# Patient Record
Sex: FEMALE | URBAN - METROPOLITAN AREA
[De-identification: names, ages, dates, MRNs, and addresses within clinical notes are randomized per-mention and may not be internally consistent; named-entity substitution may affect disease eponyms.]

---

## 2020-03-17 DIAGNOSIS — Z76.82 ORGAN TRANSPLANT CANDIDATE: Primary | ICD-10-CM

## 2020-03-19 ENCOUNTER — TELEPHONE (OUTPATIENT)
Dept: TRANSPLANT | Facility: CLINIC | Age: 40
End: 2020-03-19

## 2020-10-09 DIAGNOSIS — Z76.82 ORGAN TRANSPLANT CANDIDATE: Primary | ICD-10-CM

## 2020-10-21 ENCOUNTER — TELEPHONE (OUTPATIENT)
Dept: TRANSPLANT | Facility: CLINIC | Age: 40
End: 2020-10-21

## 2020-11-16 ENCOUNTER — TELEPHONE (OUTPATIENT)
Dept: TRANSPLANT | Facility: CLINIC | Age: 40
End: 2020-11-16

## 2020-11-17 ENCOUNTER — HOSPITAL ENCOUNTER (OUTPATIENT)
Dept: RADIOLOGY | Facility: HOSPITAL | Age: 40
Discharge: HOME | End: 2020-11-17
Attending: NURSE PRACTITIONER
Payer: MEDICARE

## 2020-11-17 ENCOUNTER — TELEPHONE (OUTPATIENT)
Dept: TRANSPLANT | Facility: CLINIC | Age: 40
End: 2020-11-17

## 2020-11-17 ENCOUNTER — OFFICE VISIT (OUTPATIENT)
Dept: TRANSPLANT | Facility: CLINIC | Age: 40
End: 2020-11-17
Payer: MEDICARE

## 2020-11-17 VITALS
SYSTOLIC BLOOD PRESSURE: 166 MMHG | TEMPERATURE: 98 F | DIASTOLIC BLOOD PRESSURE: 78 MMHG | HEIGHT: 63 IN | HEART RATE: 82 BPM | OXYGEN SATURATION: 98 % | WEIGHT: 153 LBS | BODY MASS INDEX: 27.11 KG/M2 | RESPIRATION RATE: 16 BRPM

## 2020-11-17 DIAGNOSIS — Z76.82 ORGAN TRANSPLANT CANDIDATE: ICD-10-CM

## 2020-11-17 DIAGNOSIS — E08.10 DIABETES MELLITUS DUE TO UNDERLYING CONDITION WITH KETOACIDOSIS WITHOUT COMA, WITHOUT LONG-TERM CURRENT USE OF INSULIN: ICD-10-CM

## 2020-11-17 DIAGNOSIS — I10 ESSENTIAL HYPERTENSION: ICD-10-CM

## 2020-11-17 DIAGNOSIS — E11.21 DIABETIC NEPHROPATHY ASSOCIATED WITH TYPE 2 DIABETES MELLITUS: ICD-10-CM

## 2020-11-17 DIAGNOSIS — N18.6 ESRD (END STAGE RENAL DISEASE): ICD-10-CM

## 2020-11-17 DIAGNOSIS — D63.1 ANEMIA, CHRONIC RENAL FAILURE, STAGE 5: ICD-10-CM

## 2020-11-17 DIAGNOSIS — N18.5 ANEMIA, CHRONIC RENAL FAILURE, STAGE 5: ICD-10-CM

## 2020-11-17 DIAGNOSIS — Z72.0 TOBACCO ABUSE: ICD-10-CM

## 2020-11-17 DIAGNOSIS — N25.81 SECONDARY HYPERPARATHYROIDISM OF RENAL ORIGIN: ICD-10-CM

## 2020-11-17 PROCEDURE — 99205 OFFICE O/P NEW HI 60 MIN: CPT | Mod: S$PBB,TXP,, | Performed by: INTERNAL MEDICINE

## 2020-11-17 PROCEDURE — 99999 PR PBB SHADOW E&M-EST. PATIENT-LVL IV: CPT | Mod: PBBFAC,TXP,, | Performed by: INTERNAL MEDICINE

## 2020-11-17 PROCEDURE — 71046 X-RAY EXAM CHEST 2 VIEWS: CPT | Mod: 26,TXP,, | Performed by: RADIOLOGY

## 2020-11-17 PROCEDURE — 99205 PR OFFICE/OUTPT VISIT, NEW, LEVL V, 60-74 MIN: ICD-10-PCS | Mod: S$PBB,TXP,, | Performed by: INTERNAL MEDICINE

## 2020-11-17 PROCEDURE — 99204 PR OFFICE/OUTPT VISIT, NEW, LEVL IV, 45-59 MIN: ICD-10-PCS | Mod: S$PBB,TXP,, | Performed by: TRANSPLANT SURGERY

## 2020-11-17 PROCEDURE — 71046 XR CHEST PA AND LATERAL: ICD-10-PCS | Mod: 26,TXP,, | Performed by: RADIOLOGY

## 2020-11-17 PROCEDURE — 72170 X-RAY EXAM OF PELVIS: CPT | Mod: 26,TXP,, | Performed by: RADIOLOGY

## 2020-11-17 PROCEDURE — 71046 X-RAY EXAM CHEST 2 VIEWS: CPT | Mod: TC,TXP

## 2020-11-17 PROCEDURE — 72170 XR PELVIS ROUTINE AP: ICD-10-PCS | Mod: 26,TXP,, | Performed by: RADIOLOGY

## 2020-11-17 PROCEDURE — 72170 X-RAY EXAM OF PELVIS: CPT | Mod: TC,TXP

## 2020-11-17 PROCEDURE — 99999 PR PBB SHADOW E&M-EST. PATIENT-LVL IV: ICD-10-PCS | Mod: PBBFAC,TXP,, | Performed by: INTERNAL MEDICINE

## 2020-11-17 PROCEDURE — 99204 OFFICE O/P NEW MOD 45 MIN: CPT | Mod: S$PBB,TXP,, | Performed by: TRANSPLANT SURGERY

## 2020-11-17 PROCEDURE — 99214 OFFICE O/P EST MOD 30 MIN: CPT | Mod: PBBFAC,25,TXP | Performed by: INTERNAL MEDICINE

## 2020-11-17 RX ORDER — SULFAMETHOXAZOLE AND TRIMETHOPRIM 800; 160 MG/1; MG/1
1 TABLET ORAL DAILY
COMMUNITY
Start: 2020-11-06 | End: 2020-11-24

## 2020-11-17 RX ORDER — DEXTROMETHORPHAN HYDROBROMIDE, GUAIFENESIN 5; 100 MG/5ML; MG/5ML
650 LIQUID ORAL EVERY 8 HOURS PRN
COMMUNITY
Start: 2020-09-29 | End: 2021-09-29

## 2020-11-17 RX ORDER — HYDRALAZINE HYDROCHLORIDE 100 MG/1
100 TABLET, FILM COATED ORAL 2 TIMES DAILY
COMMUNITY
Start: 2020-11-06

## 2020-11-17 RX ORDER — ONDANSETRON 4 MG/1
4 TABLET, FILM COATED ORAL EVERY 8 HOURS PRN
COMMUNITY
Start: 2020-11-13

## 2020-11-17 RX ORDER — FERRIC CITRATE 210 MG/1
3 TABLET, COATED ORAL
COMMUNITY
Start: 2020-11-06

## 2020-11-17 RX ORDER — FUROSEMIDE 40 MG/1
1 TABLET ORAL
COMMUNITY
Start: 2020-10-19

## 2020-11-17 RX ORDER — INSULIN LISPRO 100 [IU]/ML
3 INJECTION, SOLUTION INTRAVENOUS; SUBCUTANEOUS 3 TIMES DAILY PRN
COMMUNITY

## 2020-11-17 RX ORDER — LOSARTAN POTASSIUM 100 MG/1
100 TABLET ORAL DAILY
COMMUNITY
Start: 2020-07-13

## 2020-11-17 RX ORDER — GABAPENTIN 100 MG/1
100 CAPSULE ORAL NIGHTLY
COMMUNITY
Start: 2020-10-30

## 2020-11-17 RX ORDER — OMEPRAZOLE 40 MG/1
40 CAPSULE, DELAYED RELEASE ORAL DAILY
COMMUNITY
Start: 2020-09-03

## 2020-11-17 RX ORDER — AMLODIPINE BESYLATE 10 MG/1
10 TABLET ORAL DAILY
COMMUNITY
Start: 2020-09-18

## 2020-11-17 RX ORDER — INSULIN GLARGINE 100 [IU]/ML
10 INJECTION, SOLUTION SUBCUTANEOUS DAILY
COMMUNITY
Start: 2020-04-03 | End: 2021-04-03

## 2020-11-17 RX ORDER — OXYCODONE HYDROCHLORIDE 5 MG/1
1 TABLET ORAL EVERY 4 HOURS PRN
COMMUNITY
Start: 2020-10-05

## 2020-11-17 RX ORDER — TRAMADOL HYDROCHLORIDE AND ACETAMINOPHEN 37.5; 325 MG/1; MG/1
1 TABLET, FILM COATED ORAL 2 TIMES DAILY PRN
COMMUNITY
Start: 2020-11-06

## 2020-11-17 RX ORDER — BUPROPION HYDROCHLORIDE 150 MG/1
150 TABLET, EXTENDED RELEASE ORAL DAILY
COMMUNITY
Start: 2020-10-26

## 2020-11-17 RX ORDER — CARVEDILOL 25 MG/1
25 TABLET ORAL 2 TIMES DAILY
COMMUNITY
Start: 2020-10-19

## 2020-11-17 NOTE — PROGRESS NOTES
Transplant Recipient Adult Psychosocial Assessment    Jazmine Deese  [address]  [place] [address]  Telephone Information:   Mobile [phone]   Home  [phone] (home)  Work  There is no work phone number on file.  E-mail  [email or web address]    Sex: female  YOB: 1980  Age: 40 y.o.    Encounter Date: 11/17/2020  [place] Citizen: yes  Primary Language: English   Needed: no    Emergency Contact:  Portillo Ordoñez, 46 yo , [place] ([place] [facility]), does drive/own car, unemployed/laid off/applying for disability. [phone]  Gina Aguirre, 42 yo sister, [place], does drive/own car, employed as CNA. [phone]    Family/Social Support:   Number of dependents/: pt denies. Pt reports sometimes babysit 3 yo grandson  Marital history:  to Portillo for 10 years  Other family dynamics: Pt reports  Portillo and sister Gina will be her transplant caregivers. Pt reports having 74 yo father Cong, 2 biological grown children and 2 step children that live nearby and can assist once patient discharged home.  Grown biological children: Aakash Guerrero 21, Isidoro Grande 19     Grown step children:  Donnell Ordoñez 20 yo, Rylee  19 yo     Household Composition:  Portillo Ordoñez, 46 yo , [place] ([place] [facility]), does drive/own car, unemployed/laid off/applying for disability. [phone]    Do you and your caregivers have access to reliable transportation? yes  PRIMARY CAREGIVER: Portillo Ordoñez, , will be primary caregiver, phone number [phone]     provided in-depth information to patient and caregiver regarding pre- and post-transplant caregiver role.   strongly encourages patient and caregiver to have concrete plan regarding post-transplant care giving, including back-up caregiver(s) to ensure care giving needs are met as needed.    Patient and Caregiver states understanding all aspects of caregiver  "role/commitment and is able/willing/committed to being caregiver to the fullest extent necessary.    Patient and Caregiver verbalizes understanding of the education provided today and caregiver responsibilities.         remains available. Patient and Caregiver agree to contact  in a timely manner if concerns arise.      Able to take time off work without financial concerns: yes.     Additional Significant Others who will Assist with Transplant:  Gina Aguirre, 42 yo sister, [place], does drive/own car, employed as CNA. [phone]    Living Will: no  Healthcare Power of : no  Advance Directives on file: <<no information> per medical record.  Verbally reviewed LW/HCPA information.   provided patient with copy of LW/HCPA documents and provided education on completion of forms.    Living Donors: Education and resource information given to patient.    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    Spouse/Significant Other Employment:  Portillo reports is unemployed.  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  reports was laid off February 2020 and  reports is now applying for disability.    Disabled: yes due to ESRD    Monthly Income:  Pt's ESRD disability income: $783  has no income.  Able to afford all costs now and if transplanted, including medications: yes  Patient and Caregiver verbalizes understanding of personal responsibilities related to transplant costs and the importance of having a financial plan to ensure " that patients transplant costs are fully covered.       provided fundraising information/education. Patient and Caregiververbalizes understanding.   remains available.    Insurance:   Payor/Plan Subscr  Sex Relation Sub. Ins. ID Effective Group Num   1. MEDICARE - PRETTY MILLER 1980 Female Self [id number]                                    [address]   2. [facility]* PRETTY ANDRADE 1980 Female Self [id number] 3/1/20                                    [address] [address]     Primary Insurance (for UNOS reporting): Public Insurance - Medicare FFS (Fee For Service)  Secondary Insurance (for UNOS reporting): Public Insurance - Medicaid [place]. Usually uses [employer] for medications.  Patient and Caregiver verbalizes clear understanding that patient may experience difficulty obtaining and/or be denied insurance coverage post-surgery. This includes and is not limited to disability insurance, life insurance, health insurance, burial insurance, long term care insurance, and other insurances.      Patient and Caregiver also reports understanding that future health concerns related to or unrelated to transplantation may not be covered by patient's insurance.  Resources and information provided and reviewed.     Patient and Caregiver provides verbal permission to release any necessary information to outside resources for patient care and discharge planning.  Resources and information provided are reviewed.      [facility], [phone]. FAX:[phone].   Hemodialysis:  MWF 4.5 to 5 hours    Dialysis Adherence: Patient and Caregiver reports patient having high dialysis compliance with treatments and instructions for past 3 months.  Dialysis compliance update.    Infusion Service: patient utilizing? no  Home Health: patient utilizing? no  DME: yes glucometer/diabetic medicine and supplies, bp cuff,   Pulmonary/Cardiac Rehab: pt denies  ADLS:  Independent with self  care.  and patient work together on medication management    Adherence:   Pt reports high compliance with medical appointments and instructions within last 3 months.  Adherence education and counseling provided.     Per History Section:  Past Medical History:   Diagnosis Date    Anemia, chronic renal failure, stage 5 11/17/2020    Anxiety     Depression     Diabetes mellitus     insulin dependent since she was 13 years-old    Diabetic nephropathy associated with type 2 diabetes mellitus 11/17/2020    Disorder of kidney and ureter     ESRD (end stage renal disease) 11/17/2020    Essential hypertension 11/17/2020    Hypertension     Secondary hyperparathyroidism of renal origin 11/17/2020    Tobacco abuse heavy smoker for 27 years     Social History     Tobacco Use    Smoking status: Current Every Day Smoker     Packs/day: 2.00     Years: 27.00     Pack years: 54.00     Start date: 11/17/1993   Substance Use Topics    Alcohol use: Not Currently     Frequency: 2-4 times a month     Social History     Substance and Sexual Activity   Drug Use Yes    Types: Marijuana    Comment: last itme was many years ago she says     Social History     Substance and Sexual Activity   Sexual Activity Not on file    Comment: she is here with the        Per Today's Psychosocial:  Tobacco: yes heavy smoker; 2 to 2.5 packs per day. Pt was referred to dialysis SW for local smoking cessation treatment. Pt reports understanding will need to be tobacco free for pancreas/kidney transplant and is in agreement to stopping all tobacco use.  Alcohol: none, patient denies any use.  Illicit Drugs/Non-prescribed Medications: none, patient denies any use at this time. Pt reports tried marijuana as a teenager. It has been many years since she has smoked marijuana.    Patient and Caregiver states clear understanding of the potential impact of substance use as it relates to transplant candidacy and is aware of possible random  substance screening.  Substance abstinence/cessation counseling, education and resources provided and reviewed.     Arrests/DWI/Treatment/Rehab: patient denies    Psychiatric History:    Mental Health: Pt reports history of anxiety and depression. Pt reports having anxiety panic attacks where it feels like a heart attack. Pt reports usually feels panic when she has been unable to feel secure in an medical outcome or if she does not understand why she is not feeling well or if her daily routine is disrupted. Transplant SW informed patient all medical interventions, including transplant surgery and recovery, will could impact her daily routine, could impact her feeling well and the outcome may not be what she wanted or expected; pt reports understanding of same. Pt reports dialysis doctor placed her on Wellbutrin about 3 or 4 months to treat her mood. Pt reports her anxiety has decreased but does experience panic/anxiety from time to time. Transplant SW discussed patient's above reported mood problems with transplant nephrologist -- patient will need local mental health referral and will need psychiatric clearance for organ transplant evaluation. Transplant discussed above with pt's dialysis SW Zachary who reports dialysis team will refer patient to local mental health providers and will refer patient for smoking cessation program. Transplant SW will follow up with patient and dialysis SW in 2 weeks regarding if those resources have been arranged.   Psychiatrist/Counselor: pt denies  Medications:  Wellbutrin  mg every day  Suicide/Homicide Issues: pt denies any si/hi history  Safety at home: pt reports is living in safe home environment with no abuse reported    Knowledge: Patient and Caregiver states having clear understanding and realistic expectations regarding the potential risks and potential benefits of organ transplantation and organ donation and agrees to discuss with health care team members and  support system members, as well as to utilize available resources and express questions and/or concerns in order to further facilitate the pt informed decision-making.  Resources and information provided and reviewed.    Patient and Caregiver is aware of [facility]'s affiliation and/or partnership with agencies in home health care, LTAC, SNF, [facility], and other hospitals and clinics.    Understanding: Patient and Caregiver reports having a clear understanding of the many lifetime commitments involved with being a transplant recipient, including costs, compliance, medications, lab work, procedures, appointments, concrete and financial planning, preparedness, timely and appropriate communication of concerns, abstinence (ETOH, tobacco, illicit non-prescribed drugs), adherence to all health care team recommendations, support system and caregiver involvement, appropriate and timely resource utilization and follow-through, mental health counseling as needed/recommended, and patient and caregiver responsibilities.  Social Service Handbook, resources and detailed educational information provided and reviewed.  Educational information provided.    Patient and Caregiver also reports current and expected compliance with health care regime and states having a clear understanding of the importance of compliance.      Patient and Caregiver reports a clear understanding that risks and benefits may be involved with organ transplantation and with organ donation.       Patient and Caregiver also reports clear understanding that psychosocial risk factors may affect patient, and include but are not limited to feelings of depression, generalized anxiety, anxiety regarding dependence on others, post traumatic stress disorder, feelings of guilt and other emotional and/or mental concerns, and/or exacerbation of existing mental health concerns.  Detailed resources provided and discussed.      Patient and Caregiver agrees to access appropriate  resources in a timely manner as needed and/or as recommended, and to communicate concerns appropriately.  Patient and Caregiver also reports a clear understanding of treatment options available.     Patient and Caregiver received education in a group setting.   reviewed education, provided additional information, and answered questions.    Feelings or Concerns: pt reports high motivation to pursue organ transplant    Coping: Identify Patient & Caregiver Strategies to [employer]:   1. Currently & Pre-transplant - joann and prayer; family support; enjoys caring for and playing with her 1 yo grandchild who lives nearby   2. At the time of surgery - joann and prayer; family support   3. During post-Transplant & Recovery Period -joann and prayer; family support     Goals: Pt reports hope to return to work once transplanted and recovered.  Patient referred to Vocational Rehabilitation.    Interview Behavior: Patient and Caregiver presents as alert and oriented x 4, pleasant, good eye contact, well groomed, recall good, concentration/judgement good, average intelligence, calm, communicative, cooperative and asking and answering questions appropriately. Pt's supportive  Portillo in session with patient's permission.         Transplant Social Work - Candidacy  Assessment/Plan:     Psychosocial Suitability: Patient presents as high risk candidate for kidney pancreas transplant at this time. Based on psychosocial risk factors, patient presents as high risk, due to patient reporting history of anxiety and mood problems with no ongoing psychiatric evaluation or treatment in place and due to patient reporting smoking 2.5 to 3 packs of cigarettes per day.  Patient to be referred to local mental health agency for therapy and support and for psychiatric clearance for organ transplant. Patient to be referred to local smoking cessation program to promote discontinuing all tobacco use and provide support for tobacco  abstinence. Pt reports having organ transplant caregiver/transportation plan, medical insurance plan, and plan to afford transplant costs all in place.    Recommendations/Additional Comments: Dialysis compliance update requested. Local mental health consult and local smoking cessation program consult needed for patient's organ transplant evaluation. Pt's dialysis SW Zachary reports dialysis team will refer patient to local mental health providers and will refer patient for smoking cessation program. Patient and dialysis SW informed that pt engaging and completing these programs are required for organ transplant evaluation and listing. Transplant SW will follow up with patient and dialysis SW in 2 weeks regarding if those resources have been arranged. Pt reports lives away and will need organ transplant lodging; lodging was reviewed and discussed. Pt was highly encouraged to transplant fund raise as patient's  is unemployed and applying for medical disability (back injury at work).    Final determination of transplant candidacy will be made once work up is complete and reviewed by the selection committee.    Esperanza An MSW LCSW

## 2020-11-17 NOTE — PROGRESS NOTES
Transplant Nephrology  Kidney Transplant Recipient Evaluation    Referring Physician: NEWTON Mckay  Current Nephrologist: NEWTON Mckay    Subjective:   CC:  Initial evaluation of kidney transplant candidacy.    HPI:  Ms. Ordoñez is a 40 y.o. year old Unknown female who has presented to be evaluated as a potential kidney transplant recipient.  She has ESRD secondary to diabetic nephropathy.  Patient is currently on hemodialysis started on 2/2019. Patient is dialyzing on MWF schedule.  Patient reports that she is tolerating dialysis well.. She has a LUE AV fistula for dialysis access.     Patient was diagnosed with DM at age 13, always insulin dependent. She said that a NP was following for her DM, hernan not remember the other providers in the past. She denied admissions due to uncontrolled DM    She wa diagnosed with CKD in 2016.     She is a smoker since age 13 2 packs a day, current smoker. She denied CAD PVD CHF STROKES, looks frail and older than states age.    No foot ulcers or amputations. Had a normal stress test in August 2019    She refers a colonoscopy in September 2020 due to diarrhea which is still an ongoing issue. She has an appointment with GI in January 2021    Her colonoscopy showed a few polyps    Mammogram and breast US  7/2020 negative for malignancy     Patient had a blood transfusion in September 2020 due to bleeding complication after hysterectomy as per the care giver ()    7/2019:    Impressions                                                             -----------                                                                                                                                     1.The calculated LV ejection fraction (2D Teich) is 62.3 %                                                                                      2.There is mild concentric LV hypertrophy                                                                                                        3.LV systolic function is normal                                                                                                                4.Left Atrium chamber is mildly dilated                                                                                                         5.There is mild tricuspid regurgitation                                                                                                         6.Estimated RVSP is 46 mmHg                                             Compared to the previous echocardiogram report the following changes    have occurred: Increased RVSP, Trivial pericardial effusion, Mild MR,   LA volume increased, Mild LAE, Dilated IVC w/ increased RAP.          She had a CT of the abdomen in  that showed the following: as per the  she was referred to see a urologist but it is unclear to me the results of this clinic visits and unclear the follow ups     1. Splenomegaly.  2. Periaortic lymphadenopathy with some   enlarged nodes measuring up to 1.4  x 2.2 cm.  3. Hydronephrosis left kidney and delayed   excretion into the left renal  collecting system. The left ureter is not   dilated past the level of the  UPJ. I did not see any obstructing calculi in   the left ureter. The ureter  does pass in proximity to the enlarged   paraaortic nodes, however.  4. No other significant findings in the           Previous Transplant: no    Past Medical and Surgical History: Ms. Ordoñez  has a past medical history of Anxiety, Depression, Diabetes mellitus, Disorder of kidney and ureter, Hypertension, and Tobacco abuse.  She has a past surgical history that includes Hysterectomy (10/2020);  section; Abdominoplasty; Ankle surgery (Left); AV fistula placement (Left); Laparoscopic insertion of peritoneal dialysis catheter; and Removal of catheter.    Past Social and Family History: Ms. Ordoñez reports that she has been smoking. She started smoking about 27 years ago. She  "has a 54.00 pack-year smoking history. She does not have any smokeless tobacco history on file. She reports previous alcohol use. She reports current drug use. Drug: Marijuana. Her family history includes Cancer (age of onset: 54) in her mother; Diabetes in her sister; Hypertension in her mother and sister; No Known Problems in her father and son.    Review of Systems     As above  Refers some SOB  Not very active  Depressed mood. Former   several years ago as well as her son.   Heavy smoker since age 13, 3 packs / day   Not following with any provider for her depression  Not following with smoking cessation program    Rest of the ROS is negative      Objective:   Blood pressure (!) 166/78, pulse 82, temperature 97.5 °F (36.4 °C), temperature source Oral, resp. rate 16, height 5' 2.99" (1.6 m), weight 69.4 kg (153 lb), SpO2 98 %.body mass index is 27.11 kg/m².    Physical Exam     chronically ill looking lady older than stated age.     Head: normocephalic  Neck: No JVD, cervical axillary, or femoral adenopathies  Heart: no murmurs, Normal s1 and s2, No gallops, no rubs, No murmurs  Lungs; Bilateral basal crackle, good respiratory effort, no crackles  Abdomen: soft, non tender, no splenomegaly or hepatomegaly, no massess, no bruits  Extremities: No edema, skin rash, joint pain. LUE AVF  SNC: awake, alert oriented. Cranial nerves are intact, no focalized, sensitivity and strength preserved        Labs:  Lab Results   Component Value Date    WBC 11.90 2020    HGB 8.9 (L) 2020    HCT 29.7 (L) 2020     2020    K 4.9 2020     2020    CO2 24 2020    BUN 43 (H) 2020    CREATININE 5.3 (H) 2020    EGFRNONAA 9.3 (A) 2020    CALCIUM 8.4 (L) 2020    PHOS 5.9 (H) 2020    ALBUMIN 2.8 (L) 2020    AST 40 2020    ALT 36 2020    .0 (H) 2020       No results found for: PREALBUMIN, BILIRUBINUA, GGT, AMYLASE, " LIPASE, PROTEINUA, NITRITE, RBCUA, WBCUA    No results found for: HLAABCTYPE    Labs were reviewed with the patient.    Assessment:     1. Diabetes mellitus due to underlying condition with ketoacidosis without coma, without long-term current use of insulin    2. Tobacco abuse    3. Essential hypertension    4. Diabetic nephropathy associated with type 2 diabetes mellitus    5. ESRD (end stage renal disease)    6. Secondary hyperparathyroidism of renal origin    7. Anemia, chronic renal failure, stage 5        Plan:     Transplant Candidacy:   Based on available information, Ms. Ordoñez is a high-risk kidney transplant candidate.   Meets center eligibility for accepting HCV+ donor offer - yes.  Patient educated on HCV+ donors. Jazmine is willing to accept HCV+ donor offer - yes   Patient is a candidate for KDPI > 85 kidney donor offer - no.      I had a very detailed conversation with patient and  regarding her current transplant candidacy. Patient has an untreated depression and tobacco abuse (3 packs/day since age 13- for 27 years-). She looks debilitated, frail and chronically ill. It is on her best interest to be referred to a local psychiatrist to address her depression. Also I strongly advised the patient top join a smoking cessation program. At the present time these two issues need to be addressed fist before she can be listed for a kidney or simultaneous  kidney/pancreas transplant.    I spoke with our SW to communicate this to the SW with the dialysis unit to guide patient navigate the local health system to find these providers.     I discussed with the patient the importance of smoking cessation not only in terms of transplant candidacy but also to minimize CV risks after her transplant.     She is physically  Inactive, sedentary. I advised to gradually increase physical activity    No living donors    We spoke at least for 1 hr. I reviewed records available including stress test. Also review some  of care everywhere records.    Patient will be discussed in our meeting next Friday.    She will see the providers today and will have kidney US and chest x Ry    Extensive education provided    All questions answered       MD [facility] Patient Status  Functional Status: 60% - Requires occasional assistance but is able to care for needs  Physical Capacity: Limited Mobility

## 2020-11-17 NOTE — TELEPHONE ENCOUNTER
Reviewed pt transplant labs.  Notified dialysis unit dietitian of the following abnormal labs via fax and requested their most recent nutrition note on this pt.  Once this note is received it will be scanned into pt's chart.    Glu 198  Alb 2.8  Phos 5.9

## 2020-11-17 NOTE — LETTER
November 18, 2020        NEWTON Mckay  [address]  [place] [address]  Phone: [phone]  Fax: [phone]             [facility] 1st Fl  [address]  [facility] [address]  Phone: [phone]   Patient: Jazmine Ordoñez   MR Number: [id number]   YOB: 1980   Date of Visit: 11/17/2020       Dear Dr. NEWTON Mckay    Thank you for referring Jazmine Ordoñez to me for evaluation. Attached you will find relevant portions of my assessment and plan of care.    If you have questions, please do not hesitate to call me. I look forward to following Jazmine Ordoñez along with you.    Sincerely,    Valerio Sykes MD    Enclosure    If you would like to receive this communication electronically, please contact [email or web address] or [phone] to request [employer] access.    [employer] is a tool which provides read-only access to select patient information with whom you have a relationship. Its easy to use and provides real time access to review your patients record including encounter summaries, notes, results, and demographic information.    If you feel you have received this communication in error or would no longer like to receive these types of communications, please e-mail [email or web address]

## 2020-11-17 NOTE — PROGRESS NOTES
PHARM.D. PRE-TRANSPLANT NOTE:    This patient's medication therapy was evaluated as part of her pre-transplant evaluation.      The following general pharmacologic concerns were noted: none    The following concerns for post-operative pain management were noted: has a small of pain medication filled 3 times over the alst 5 months      The following pharmacologic concerns related to HCV therapy were noted: none      This patient's medication profile was reviewed for considerations for DAA Hepatitis C therapy:    [x]  No current inducers of CYP 3A4 or PGP  [x]  No amiodarone on this patient's EMR profile in the last 24 months  [x]  No past or current atrial fibrillation on this patient's EMR profile       Current Outpatient Medications   Medication Sig Dispense Refill    acetaminophen (TYLENOL) 650 MG TbSR Take 650 mg by mouth every 8 (eight) hours as needed.      amLODIPine (NORVASC) 10 MG tablet Take 10 mg by mouth once daily.      AURYXIA 210 mg iron Tab Take 3 tablets by mouth 3 (three) times daily with meals.      buPROPion (WELLBUTRIN SR) 150 MG TBSR 12 hr tablet Take 150 mg by mouth once daily.      carvediloL (COREG) 25 MG tablet Take 25 mg by mouth 2 (two) times a day.      folic acid/vit B complex and C (NEPHRO-NOAH ORAL) Take 1 tablet by mouth once daily.      furosemide (LASIX) 40 MG tablet Take 1 tablet by mouth as needed. On non-dialysis days      gabapentin (NEURONTIN) 100 MG capsule Take 100 mg by mouth every evening.      hydrALAZINE (APRESOLINE) 100 MG tablet Take 100 mg by mouth 2 (two) times a day.      insulin (LANTUS SOLOSTAR U-100 INSULIN) glargine 100 units/mL (3mL) SubQ pen Inject 10 Units into the skin once daily.      insulin lispro 100 unit/mL injection Inject 3 Units into the skin 3 (three) times daily as needed.      losartan (COZAAR) 100 MG tablet Take 100 mg by mouth once daily.      omeprazole (PRILOSEC) 40 MG capsule Take 40 mg by mouth once daily.      ondansetron  (ZOFRAN) 4 MG tablet Take 4 mg by mouth every 8 (eight) hours as needed.      sulfamethoxazole-trimethoprim 800-160mg (BACTRIM DS) 800-160 mg Tab Take 1 tablet by mouth once daily.       No current facility-administered medications for this visit.          Currently Ms Ordoñez is responsible for preparing / administering this patient's medications on a daily basis.  I am available for consultation and can be contacted, as needed by the other members of the Kidney Transplant team.

## 2020-11-17 NOTE — PROGRESS NOTES
Transplant Surgery  Kidney/Pancreas Transplant Recipient Evaluation    Referring Physician: NEWTON Mckay  Current Nephrologist: NEWTON Mckay    Subjective:     Reason for Visit: evaluate transplant candidacy    History of Present Illness: Jazmine Ordoñez is a 40 y.o. year old female undergoing transplant evaluation.    Dialysis History: Jazmine is on hemodialysis.      Transplant History: N/A    Etiology of Renal Disease: Diabetes Mellitus - Type II (based on medical records from referral).    Review of Systems   Gastrointestinal:        Abdominjal wound from hystrectomy still not healed   Endocrine:        Occasional hypoglycemic unawareness     Skin: Positive for color change.   All other systems reviewed and are negative.      Objective:     Physical Exam:  Constitutional:   Vitals reviewed: yes   Well-nourished and well-groomed: no looks considerably older than stated age  Eyes:   Sclerae icteric: no   Extraocular movements intact: yes  GI:    Bowel sounds normal: yes   Tenderness: no    If yes, quadrant/location: not applicable   Palpable masses: no    If yes, quadrant/location: not applicable   Hepatosplenomegaly: no   Ascites: no   Hernia: no    If yes, type/location: not applicable   Surgical scars: yes multiple, from PD cath, from hysterectomy (thick drainage currently healing delayed);  abdominoplasty    If yes, type/location:   not applicable  Resp:   Effort normal: yes   Breath sounds normal: yes    CV:   Regular rate and rhythm: yes   Heart sounds normal: yes   Femoral pulses normal: yes   Extremities edematous: no  Skin:   Rashes or lesions present: no    If yes, describe:not applicable   Jaundice:: no    Musculoskeletal:   Gait normal: yes   Strength normal: yes  Psych:   Oriented to person, place, and time: yes   Affect and mood normal: yes    Additional comments: skin dark, grey appearing    Counseling: We provided Jazmine Ordoñez with a group education session today.  We discussed kidney  transplantation at length with her, including risks, potential complications, and alternatives in the management of her renal failure.  The discussion included complications related to anesthesia, bleeding, infection, primary nonfunction, and ATN.  I discussed the typical postoperative course, length of hospitalization, the need for long-term immunosuppression, and the need for long-term routine follow-up.  I discussed living-donor and -donor transplantation and the relative advantages and disadvantages of each.  I also discussed average waiting times for both living donation and  donation.  I discussed national and center-specific survival rates.  I also mentioned the potential benefit of multicenter listing to candidates listed with centers within more than one organ procurement organization.  All questions were answered.    Final determination of transplant candidacy will be made once evaluation is complete and reviewed by the Kidney & Kidney/Pancreas Selection Committee.         Transplant Surgery - Candidacy   Assessment/Plan:   Jazmine Ordoñez has end stage renal disease (ESRD) on dialysis. I have concerns with her significant abdominal adiposity. Based on available information, Jazmine Ordoñez is a high-risk kidney/pancreas transplant candidate.     She would benefit from kp.  She should lose weight and finish healing her nonhealing hysterectomy wound before being activated.     Scar Simon Jr, MD

## 2020-11-17 NOTE — PROGRESS NOTES
MYCOPHENOLATE REMS PROGRAM:      Pt states she has had surgery for permanent contraception.  An acknowledgement form was not needed for this patient.

## 2020-11-18 ENCOUNTER — TELEPHONE (OUTPATIENT)
Dept: TRANSPLANT | Facility: CLINIC | Age: 40
End: 2020-11-18

## 2020-11-18 NOTE — TELEPHONE ENCOUNTER
----- Message from Grace Blank NP sent at 11/17/2020  4:01 PM CST -----  CXR: Pulmonary edema pneumonia aspiration or sepsis.  Non contrast CT needed

## 2020-11-18 NOTE — PROGRESS NOTES
INITIAL PATIENT EDUCATION NOTE    Ms. Jazmine Ordoñez was seen in pre-kidney transplant clinic for evaluation for kidney, kidney/pancreas or pancreas only transplant.  The patient attended a an individual video education session that discussed/reviewed the following aspects of transplantation: evaluation and selection committee process, [facility] waitlist management/multiple listings, types of organs offered (KDPI < 85%, KDPI > 85%, PHS increased risk, DCD, HCV+, HIV+ for HIV+ recipients and enbloc/dual), financial aspects, surgical procedures, dietary instruction pre- and post-transplant, health maintenance pre- and post-transplant, post-transplant hospitalization and outpatient follow-up, potential to participate in a research protocol, and medication management and side effects.  A question and answer session was provided after the presentation.    The patient was seen by all members of the multi-disciplinary team to include: Nephrologist/PA, Surgeon, , Transplant Coordinator, , Pharmacist and Dietician (if applicable).    The patient reviewed and signed all consents for evaluation which were witnessed and sent to scanning into the [facility] chart.    The patient was given an education book and plan for further evaluation based on her individual assessment.      The patient was encouraged to call with any questions or concerns.

## 2020-12-01 ENCOUNTER — SOCIAL WORK (OUTPATIENT)
Dept: TRANSPLANT | Facility: CLINIC | Age: 40
End: 2020-12-01

## 2020-12-01 NOTE — PROGRESS NOTES
Smoking cessation referral follow up and Mental Health counseling referral follow up with [facility].    [facility], [phone]. FAX:[phone].   Hemodialysis:  MWF 4.5 to 5 hours  Spoke with [facility]    Dialysis unit SW reports patient has been referred to tobacco cessation program [facility] where there is a peer counselor and nicorette gum support as well. [facility] will meet with patient tomorrow to learn how its going with smoking cessation program.    Dialysis unit SW reports patient has been referred for mental health assessment at [facility]; initial appointment is December 3, 2020. [facility] will meet with patient after that appointment to learn how the mental health appointment went.    Transplant SW will follow up with [facility] on 12-8-2020 regarding above ascertained information from patient.    Esperanza An MSW LCSW

## 2020-12-04 ENCOUNTER — COMMITTEE REVIEW (OUTPATIENT)
Dept: TRANSPLANT | Facility: CLINIC | Age: 40
End: 2020-12-04

## 2020-12-04 NOTE — COMMITTEE REVIEW
Native Organ Dx: Diabetes Mellitus - Type II      Patient will be discussed at next committee when Dr. Sykes is available.     Note written by SHILPA Fajardo    ===============================================      I was present at the meeting and attest to the decision of the committee

## 2020-12-08 ENCOUNTER — SOCIAL WORK (OUTPATIENT)
Dept: TRANSPLANT | Facility: CLINIC | Age: 40
End: 2020-12-08

## 2020-12-08 NOTE — PROGRESS NOTES
[facility], [phone]. FAX:[phone].   Hemodialysis:  MWF 4.5 to 5 hours  Spoke with Zach[facility]    Dialysis unit  states patient went for first appointment at [facility] and it was only to do paperwork. Mental health provider will see her for evaluation on Thursday 12-.     Dialysis unit  states patient has engaged with smoking cessation program and will start patient on nicotine gum as part of the treatment.     Transplant SW will reach out to Dialysis unit SW next week for follow up regarding mental health appointment and smoking cessation program.    Esperanza An MSW LCSW

## 2020-12-11 ENCOUNTER — COMMITTEE REVIEW (OUTPATIENT)
Dept: TRANSPLANT | Facility: CLINIC | Age: 40
End: 2020-12-11

## 2020-12-11 NOTE — LETTER
December 11, 20202    Jazmine Ordoñez  [address] [place] [address]    Dear Jazmine Smita:  MRN: [id number]    It is the duty of the [facility] Trans[facility] Selection Committee to determine which patients are candidates for a transplant. For this reason, our committee has the difficult task of evaluating patients to determine which ones have the greatest chance of having a successful transplant. We are aware of the magnitude of this responsibility, and we approach it with reverence and humility.    It is with regret I inform you that you are not approved for LRD/CAD transplant due to frailty, hx of tobacco abuse and untreated depression.  May be re-referred after 6 months with psy clearance, smoking cessation and pulmonary rehab.  If patient re presents will need CT abd/pelvis and iliac ultrasound related to calcifications seen on pelvic x-ray..    Your chest xray showed you have fluid in your lungs.  This could be pneumonia aspiration or sepsis, please talk to your nephrologist concerning the xray.      Based on this review, we have determined that at this time, you are not a candidate for a transplant at [facility].      The selection committee carefully considers each patient's transplant candidacy and determines whether it is safe to proceed with transplantation on a case-by-case basis using established selection criteria.  At present, the risk of proceeding with an elective transplant surgery has become too high.                                                                               Although the selection committee believes you are not a suitable transplant candidate, you have the option to be evaluated at other transplant centers who may have different selection criteria.  You may request your [facility] records be sent to any center of your choice by contacting our Medical Records Department at [phone].                                                                                Attached is a letter from the [employer] for Organ Sharing (UNOS).  It describes the services and information offered to patients by UNOS and the Organ Procurement and Transplant Network.    The [facility] Selection Committee sincerely wishes you the best and remains available to answer any questions.  Please do not hesitate to contact our pre-transplant office if we can assist you in any other way.                                                                         Sincerely,    Lucretia Banegas MD  Medical Director, Kidney & Kidney/Pancreas Transplantation  tj/Enclosed  Cc: [facility]                 The Organ Procurement and Transplantation Network   Toll-free patient services line: Your resource for organ transplant information     If you have a question regarding your own medical care, you always should call your transplant hospital first. However, for general organ transplant-related information, you can call the Organ Procurement and Transplantation Network (OPTN) toll-free patient services line at [phone].     Anyone, including potential transplant candidates, candidates, recipients, family members, friends, living donors, and donor family members, can call this number to:     · Talk about organ donation, living donation, the transplant process, the donation process, and transplant policies.   · Get a free patient information kit with helpful booklets, waiting list and transplant information, and a list of all transplant hospitals.   · Ask questions about the OPTN website (https://optn.transplant.hrsa.gov/), the [employer] for Organ Sharings (UNOS) website (https://unos.org/), or the UNOS website for living donors and transplant recipients. (https://www.transplantliving.org/).   · Learn how the OPTN can help you.   · Talk about any concerns that you may have with a transplant hospital.     The nations transplant system, the OPTN, is managed under federal contract  by the [employer] Network for Organ Sharing (UNOS), which is a non-profit charitable organization. The OPTN helps create and define organ sharing policies that make the best use of donated organs. This process continuously evaluating new advances and discoveries so policies can be adapted to best serve patients waiting for transplants. To do so, the OPTN works closely with transplant professionals, transplant patients, transplant candidates, donor families, living donors, and the public. All transplant programs and organ procurement organizations throughout the country are OPTN members and are obligated to follow the policies the OPTN creates for allocating organs.     The OPTN also is responsible for:   · Providing educational material for patients, the public, and professionals.   · Raising awareness of the need for donated organs and tissue.   · Coordinating organ procurement, matching, and placement.   · Collecting information about every organ transplant and donation that occurs in the [place].     Remember, you should contact your transplant hospital directly if you have questions or concerns about your own medical care including medical records, work-up progress, and test results.     We are not your transplant hospital, and our staff will not be able to answer questions about your case, so please keep your transplant hospitals phone number handy.   However, while you research your transplant needs and learn as much as you can about transplantation and donation, we welcome your call to our toll-free patient services line at [phone].

## 2020-12-11 NOTE — COMMITTEE REVIEW
Native Organ Dx: Diabetes Mellitus - Type II      Not approved for LRD/CAD transplant due to frailty, hx of tobacco abuse and untreated depression.  May be re-referred after 6 months with psy clearance, smoking cessation and pulmonary rehab.  If patient re presents will need CT abd/pelvis and iliac ultrasound related to calcifications seen on pelvic x-ray.      JEAN PIERRE Koch, RN    ===============================================    I was present at the meeting and attest to the decision of the committee

## 2020-12-14 ENCOUNTER — DOCUMENTATION ONLY (OUTPATIENT)
Dept: TRANSPLANT | Facility: CLINIC | Age: 40
End: 2020-12-14

## 2020-12-14 ENCOUNTER — TELEPHONE (OUTPATIENT)
Dept: TRANSPLANT | Facility: CLINIC | Age: 40
End: 2020-12-14

## 2020-12-14 NOTE — TELEPHONE ENCOUNTER
12/14/20 Called [facility] to get fax # as a result of multiple failed fax attempts.  I also informed SHILPA Rowe that the pt is denied but can be re referred after completing the things in the denial letter.  I informed the RN about her abnormal CXR and faxed all, abnormal labs, notes and xray results to the [facility].